# Patient Record
Sex: FEMALE | URBAN - METROPOLITAN AREA
[De-identification: names, ages, dates, MRNs, and addresses within clinical notes are randomized per-mention and may not be internally consistent; named-entity substitution may affect disease eponyms.]

---

## 2019-05-12 ENCOUNTER — EMERGENCY (EMERGENCY)
Facility: HOSPITAL | Age: 84
LOS: 1 days | Discharge: HOME | End: 2019-05-12
Attending: EMERGENCY MEDICINE
Payer: MEDICARE

## 2019-05-12 VITALS
DIASTOLIC BLOOD PRESSURE: 79 MMHG | TEMPERATURE: 98 F | WEIGHT: 160.94 LBS | HEART RATE: 60 BPM | RESPIRATION RATE: 18 BRPM | SYSTOLIC BLOOD PRESSURE: 172 MMHG | OXYGEN SATURATION: 98 % | HEIGHT: 64 IN

## 2019-05-12 VITALS
DIASTOLIC BLOOD PRESSURE: 56 MMHG | OXYGEN SATURATION: 100 % | RESPIRATION RATE: 16 BRPM | SYSTOLIC BLOOD PRESSURE: 136 MMHG | HEART RATE: 60 BPM

## 2019-05-12 LAB
ALBUMIN SERPL ELPH-MCNC: 4.2 G/DL — SIGNIFICANT CHANGE UP (ref 3.3–5)
ALP SERPL-CCNC: 72 U/L — SIGNIFICANT CHANGE UP (ref 40–120)
ALT FLD-CCNC: 15 U/L — SIGNIFICANT CHANGE UP (ref 10–45)
ANION GAP SERPL CALC-SCNC: 13 MMOL/L — SIGNIFICANT CHANGE UP (ref 5–17)
APTT BLD: 30.3 SEC — SIGNIFICANT CHANGE UP (ref 27.5–36.3)
AST SERPL-CCNC: 15 U/L — SIGNIFICANT CHANGE UP (ref 10–40)
BASOPHILS # BLD AUTO: 0 K/UL — SIGNIFICANT CHANGE UP (ref 0–0.2)
BASOPHILS NFR BLD AUTO: 0.2 % — SIGNIFICANT CHANGE UP (ref 0–2)
BILIRUB SERPL-MCNC: 0.6 MG/DL — SIGNIFICANT CHANGE UP (ref 0.2–1.2)
BUN SERPL-MCNC: 28 MG/DL — HIGH (ref 7–23)
CALCIUM SERPL-MCNC: 9.1 MG/DL — SIGNIFICANT CHANGE UP (ref 8.4–10.5)
CHLORIDE SERPL-SCNC: 103 MMOL/L — SIGNIFICANT CHANGE UP (ref 96–108)
CO2 SERPL-SCNC: 25 MMOL/L — SIGNIFICANT CHANGE UP (ref 22–31)
CREAT SERPL-MCNC: 1.12 MG/DL — SIGNIFICANT CHANGE UP (ref 0.5–1.3)
EOSINOPHIL # BLD AUTO: 0.2 K/UL — SIGNIFICANT CHANGE UP (ref 0–0.5)
EOSINOPHIL NFR BLD AUTO: 2.3 % — SIGNIFICANT CHANGE UP (ref 0–6)
GLUCOSE SERPL-MCNC: 124 MG/DL — HIGH (ref 70–99)
HCT VFR BLD CALC: 43.8 % — SIGNIFICANT CHANGE UP (ref 34.5–45)
HGB BLD-MCNC: 13.9 G/DL — SIGNIFICANT CHANGE UP (ref 11.5–15.5)
INR BLD: 1 RATIO — SIGNIFICANT CHANGE UP (ref 0.88–1.16)
LYMPHOCYTES # BLD AUTO: 1.2 K/UL — SIGNIFICANT CHANGE UP (ref 1–3.3)
LYMPHOCYTES # BLD AUTO: 13.4 % — SIGNIFICANT CHANGE UP (ref 13–44)
MCHC RBC-ENTMCNC: 30.2 PG — SIGNIFICANT CHANGE UP (ref 27–34)
MCHC RBC-ENTMCNC: 31.7 GM/DL — LOW (ref 32–36)
MCV RBC AUTO: 95.4 FL — SIGNIFICANT CHANGE UP (ref 80–100)
MONOCYTES # BLD AUTO: 0.9 K/UL — SIGNIFICANT CHANGE UP (ref 0–0.9)
MONOCYTES NFR BLD AUTO: 9.6 % — SIGNIFICANT CHANGE UP (ref 2–14)
NEUTROPHILS # BLD AUTO: 6.9 K/UL — SIGNIFICANT CHANGE UP (ref 1.8–7.4)
NEUTROPHILS NFR BLD AUTO: 74.5 % — SIGNIFICANT CHANGE UP (ref 43–77)
PLATELET # BLD AUTO: 213 K/UL — SIGNIFICANT CHANGE UP (ref 150–400)
POTASSIUM SERPL-MCNC: 3.8 MMOL/L — SIGNIFICANT CHANGE UP (ref 3.5–5.3)
POTASSIUM SERPL-SCNC: 3.8 MMOL/L — SIGNIFICANT CHANGE UP (ref 3.5–5.3)
PROT SERPL-MCNC: 7.1 G/DL — SIGNIFICANT CHANGE UP (ref 6–8.3)
PROTHROM AB SERPL-ACNC: 11.5 SEC — SIGNIFICANT CHANGE UP (ref 10–12.9)
RBC # BLD: 4.6 M/UL — SIGNIFICANT CHANGE UP (ref 3.8–5.2)
RBC # FLD: 13.3 % — SIGNIFICANT CHANGE UP (ref 10.3–14.5)
SODIUM SERPL-SCNC: 141 MMOL/L — SIGNIFICANT CHANGE UP (ref 135–145)
WBC # BLD: 9.3 K/UL — SIGNIFICANT CHANGE UP (ref 3.8–10.5)
WBC # FLD AUTO: 9.3 K/UL — SIGNIFICANT CHANGE UP (ref 3.8–10.5)

## 2019-05-12 PROCEDURE — 73630 X-RAY EXAM OF FOOT: CPT | Mod: 26,LT

## 2019-05-12 PROCEDURE — 93971 EXTREMITY STUDY: CPT | Mod: 26

## 2019-05-12 PROCEDURE — 85027 COMPLETE CBC AUTOMATED: CPT

## 2019-05-12 PROCEDURE — 80053 COMPREHEN METABOLIC PANEL: CPT

## 2019-05-12 PROCEDURE — 36415 COLL VENOUS BLD VENIPUNCTURE: CPT

## 2019-05-12 PROCEDURE — 99284 EMERGENCY DEPT VISIT MOD MDM: CPT | Mod: 25

## 2019-05-12 PROCEDURE — 93971 EXTREMITY STUDY: CPT

## 2019-05-12 PROCEDURE — 85730 THROMBOPLASTIN TIME PARTIAL: CPT

## 2019-05-12 PROCEDURE — 85610 PROTHROMBIN TIME: CPT

## 2019-05-12 PROCEDURE — 99284 EMERGENCY DEPT VISIT MOD MDM: CPT | Mod: GC

## 2019-05-12 PROCEDURE — 73630 X-RAY EXAM OF FOOT: CPT

## 2019-05-12 RX ORDER — ACETAMINOPHEN 500 MG
650 TABLET ORAL ONCE
Refills: 0 | Status: COMPLETED | OUTPATIENT
Start: 2019-05-12 | End: 2019-05-12

## 2019-05-12 RX ADMIN — Medication 650 MILLIGRAM(S): at 10:27

## 2019-05-12 NOTE — ED ADULT NURSE NOTE - OBJECTIVE STATEMENT
89 y/o female with pmh htn, pacemaker (pt unable to provide more details as she is a poor historian) presenting to the ED via walking in complaining of worsening left lower extremity swelling and pain x 3 days. Patient states dropped a  on her foot 3 days ago which is when the pain started. Patient unsure if she is on blood thinners. On assessment +2 pitting edema and purple ecchymosis discoloration noted to left ankle and left foot. Cap refill <3 seconds to bilateral lower extremities. Patient states has lower extremity edema at baseline. Right lower extremity +1 nonpitting edema noted. Unable to palpate pulses to left lower extremity due to edema. ED MD Chase at bedside with ultrasound to confirm. Pain 4/10 in left lower extremity. Patient noted difficulty bearing weight due to pain. Patient denies any numbness, tingling, SOB, fevers, dizziness, difficulty breathing, n/v/d. A&OX3. Safety and comfort measures provided.

## 2019-05-12 NOTE — ED PROVIDER NOTE - PHYSICAL EXAMINATION
L LE exam remarkable for ecchymosis, pitting edema, impressive tenderness to palpation diffusely. Faint DP pulse palpable, difficult exam 2/2 edema. Swollen as compared to R.

## 2019-05-12 NOTE — ED PROVIDER NOTE - OBJECTIVE STATEMENT
89F hx htn, cardiomyopathy with pacemaker (pt unsure if on blood thinner) p/w L foot pain, swelling, and bruising x3 days. Pt reports that symptoms began shortly after throwing a  at the floor in anger, accidentally striking the L foot. Since then her foot has swollen and become markedly tender, worse with ambulation. Pt denies f/c, sob, cp, hx blood clots.

## 2019-05-12 NOTE — ED ADULT NURSE NOTE - CHPI ED NUR SYMPTOMS NEG
no tingling/no dizziness/no weakness/no fever/no nausea/no vomiting/no decreased eating/drinking/no chills

## 2019-05-12 NOTE — ED PROVIDER NOTE - PROGRESS NOTE DETAILS
bedside POCUS performed, negative for DVT. Explained to pt that f/u study is required for full rule out in 5-7 days. Low liklihood for compartment syndrome, dopplerable DP in affected leg. Explained to pt that should symptoms worsen she should come back to the ED. found to have been on xerato - explains extensive hematoma no fx, no dvt onpocus no signs of compartment syndrome sensation intact, pos pulse despite sig swelling compartment not tense -- strict return instructions given to pt and family

## 2019-05-12 NOTE — ED PROVIDER NOTE - CLINICAL SUMMARY MEDICAL DECISION MAKING FREE TEXT BOX
tequila - pt with foot swelling pain and chymosis worsening over 3-4 days after throwing a  onto it - no numbness no fever - no ho pe dvt no cp no sob - unsure of meds ie antipltelt or anticoag, - ck xray as sig dorsal foot ttp - us r/o dvt - and reeval tequila - pt with foot swelling pain and chymosis worsening over 3-4 days after throwing a  onto it - no numbness no fever - no ho pe dvt no cp no sob - unsure of meds ie antipltelt or anticoag, - ck xray as sig dorsal foot ttp - us r/o dvt - and reeval    Lukasjose Cuellar DO, PGY-2: 89F w/ left foot swelling, pain, echymosis, 2+ pitting edema (unlateral, left only) s/p trauma. No f/c, well appearing otherwise, lungs clear. VSWNL. Plan xray, DVT r/o, basic labs/coags.

## 2019-05-12 NOTE — ED ADULT NURSE REASSESSMENT NOTE - NS ED NURSE REASSESS COMMENT FT1
received report from Page PHILLIP, patient at CT will reassess upon arrival back to ED. received report from Page PHILLIP,

## 2019-05-12 NOTE — ED ADULT NURSE REASSESSMENT NOTE - NS ED NURSE REASSESS COMMENT FT1
Patient returned to unit, +3 pitting edema noted to left leg, +2 to right, patient states, " I usually have swelling but this is more than normal." Cap refill less than 2 seconds, unable to palpate DP pulse, MD aware. Skin red, hyperpigmented on left lower extremity. Patient resting comfortably in NAD, family at bedside, aware of plan of care.

## 2024-11-21 NOTE — ED PROVIDER NOTE - ENMT, MLM
Airway patent, Nasal mucosa clear. Mouth with normal mucosa. Throat has no vesicles, no oropharyngeal exudates and uvula is midline. 24.7